# Patient Record
Sex: MALE | Race: WHITE
[De-identification: names, ages, dates, MRNs, and addresses within clinical notes are randomized per-mention and may not be internally consistent; named-entity substitution may affect disease eponyms.]

---

## 2018-08-11 ENCOUNTER — HOSPITAL ENCOUNTER (EMERGENCY)
Dept: HOSPITAL 68 - ERH | Age: 48
End: 2018-08-11
Payer: COMMERCIAL

## 2018-08-11 VITALS — DIASTOLIC BLOOD PRESSURE: 82 MMHG | SYSTOLIC BLOOD PRESSURE: 130 MMHG

## 2018-08-11 VITALS — HEIGHT: 73 IN | WEIGHT: 210 LBS | BODY MASS INDEX: 27.83 KG/M2

## 2018-08-11 DIAGNOSIS — N20.0: Primary | ICD-10-CM

## 2018-08-11 LAB
ABSOLUTE GRANULOCYTE CT: 5.7 /CUMM (ref 1.4–6.5)
BASOPHILS # BLD: 0 /CUMM (ref 0–0.2)
BASOPHILS NFR BLD: 0.4 % (ref 0–2)
EOSINOPHIL # BLD: 0.1 /CUMM (ref 0–0.7)
EOSINOPHIL NFR BLD: 1.4 % (ref 0–5)
ERYTHROCYTE [DISTWIDTH] IN BLOOD BY AUTOMATED COUNT: 13.1 % (ref 11.5–14.5)
GRANULOCYTES NFR BLD: 65.9 % (ref 42.2–75.2)
HCT VFR BLD CALC: 43.5 % (ref 42–52)
LYMPHOCYTES # BLD: 1.9 /CUMM (ref 1.2–3.4)
MCH RBC QN AUTO: 29.8 PG (ref 27–31)
MCHC RBC AUTO-ENTMCNC: 33.9 G/DL (ref 33–37)
MCV RBC AUTO: 87.8 FL (ref 80–94)
MONOCYTES # BLD: 0.9 /CUMM (ref 0.1–0.6)
PLATELET # BLD: 258 /CUMM (ref 130–400)
PMV BLD AUTO: 8 FL (ref 7.4–10.4)
RED BLOOD CELL CT: 4.95 /CUMM (ref 4.7–6.1)
WBC # BLD AUTO: 8.6 /CUMM (ref 4.8–10.8)

## 2018-08-11 NOTE — CT SCAN REPORT
EXAMINATION:
CT ABDOMEN AND PELVIS WITHOUT CONTRAST
 
CLINICAL INFORMATION:
Left lower quadrant pain. Left flank pain.
 
COMPARISON:
None
 
TECHNIQUE:
Multidetector volumetric imaging was performed from the superior aspect of
the liver through the pubic symphysis. Sagittal and coronal reformatted
images were obtained on the technologist's workstation.
 
DLP:
504 mGy-cm
 
FINDINGS:
 
LUNG BASES: The visualized lung bases are unremarkable.
 
LIVER, GALLBLADDER, AND BILIARY TREE: The liver is diffusely low in
attenuation. There are increased areas of density adjacent to the gallbladder
fossa consistent with fatty sparing. No definite focal lesion identified on
this noncontrast study. The gallbladder is unremarkable with no evidence of
radiopaque gallstones, gallbladder wall thickening, or obvious
pericholecystic inflammatory changes.
 
PANCREAS: Unremarkable.
 
SPLEEN: Unremarkable.
 
ADRENAL GLANDS: Unremarkable.
 
KIDNEYS AND URETERS: The right kidney is normal in size, shape, and
attenuation. No hydronephrosis, hydroureter. There is a 1 mm density in the
upper pole. There is a similar finding in the lower pole.. No perinephric
stranding.
 
The left kidney is normal in size. There is mild left renal hydronephrosis.
The proximal ureter is minimally dilated. The distal ureter appears normal in
caliber. No radiodense renal stone identified.
 
BLADDER: Unremarkable.
 
GASTROINTESTINAL TRACT: No dilated loops of small or large bowel. There is a
small hiatal hernia. No intra-abdominal free air or free fluid. Normal
appendix.
 
ABDOMINAL WALL: Bilateral fat-containing inguinal hernias, right greater than
left.
 
LYMPH NODES: Normal.
 
VASCULAR: Unremarkable.
 
PELVIC VISCERA: The prostate appears within normal limits for size. Seminal
vesicles are symmetric and within normal limits.
 
OSSEOUS STRUCTURES: No suspicious lytic or blastic lesion.
 
IMPRESSION:
 
1. Limited noncontrast study.
 
2. Mild left renal hydronephrosis. No definite radiodense obstructing stone.
The majority of the ureter is essentially within normal limits for caliber.
 
3. Severe hepatic steatosis.
 
4. 2 tiny 1 mm nonobstructing stones identified within the collecting system
of the right kidney.

## 2018-08-11 NOTE — ED GI/GU/ABDOMINAL COMPLAINT
History of Present Illness
 
General
Chief Complaint: General Adult
Stated Complaint: PT WAS SENT IN BY URGENT CARE
Source: patient
Exam Limitations: no limitations
 
Vital Signs & Intake/Output
Vital Signs & Intake/Output
 ED Intake and Output
 
 
  0000  1200
 
Intake Total 0 
 
Output Total  
 
Balance 0 
 
   
 
Intake, Oral 0 
 
Patient 210 lb 
 
Weight  
 
Weight Reported by Patient 
 
Measurement  
 
Method  
 
 
 
Allergies
Coded Allergies:
No Known Allergies (18)
 
Reconcile Medications
Ibuprofen 800 MG TABLET   1 TAB PO TID PRN PAIN 
Ondansetron (Zofran Odt) 4 MG TAB.RAPDIS   1 TAB SL TID PRN NAUSEA 
Tamsulosin HCl (Flomax) 0.4 MG CAP.ER.24H   1 CAP PO DAILY KIDNEY STONES 
 
Triage Note:
PT SIB URGENT CARE FOR CT SCAN TO R/O KIDNEY STONE
VS DIVERTICULITIS. PT STATES AROUND 0300 THE LEFT
SIDED FLANK PAIN BEGAN PAIN 2/10. PT DENIES ANY
URINARY S/S. URGENT CARE STATED +2 BLOOD IN URINE.
PT LAST MEDICATED AROUND 0500 WITH 650MG TYLENOL
PO. VSS.
Triage Nurses Notes Reviewed? yes
Onset: Abrupt
Duration: day(s): (1), better, continues in ED
Timing: single episode today
Quality/Severity: cramping
Severity Numbers: 7
Location: left flank
Radiation: back
Activities at Onset: none
Prior Abdominal Problems: none
Past Sexual History: Unobtainable at this time
No Modifying Factors: none
Associated Symptoms: abdominal pain
HPI:
48-year-old male with no medical history presents for evaluation of left flank 
pain.  Patient reports that symptoms started about 24 hours ago and were 
initially very severe.  The pain is located in the left flank and radiates 
slightly in the left back.  Described as sharp and cramping.  Patient states 
when the pain first started was attended 10 currently has improved to a 6 or 7. 
He reports some associated nausea but no vomiting.  No hematuria chest pain 
shortness of breath fever frequency urgency or dysuria.  No numbness or tingling
no trauma or triggering event.  He has not taken any medicine for the pain.
(Clemente Hopper)
 
Past History
 
Travel History
Traveled to [place] past 21 day No
 
Medical History
Any Pertinent Medical History? see below for history
Neurological: NONE
EENT: NONE
Cardiovascular: NONE
Respiratory: NONE
Gastrointestinal: NONE
Hepatic: NONE
Renal: NONE
Musculoskeletal: NONE
Psychiatric: NONE
Endocrine: NONE
 
Surgical History
Surgical History: non-contributory
 
Psychosocial History
What is your primary language English
Tobacco Use: Never used
 
Family History
Hx Contributory? No
(Clemente Hopper)
 
Review of Systems
 
Review of Systems
Constitutional:
Reports: no symptoms. 
EENTM:
Reports: no symptoms. 
Respiratory:
Reports: no symptoms. 
Cardiovascular:
Reports: no symptoms. 
GI:
Reports: see HPI, abdominal pain (FLANK PAIN ). 
Genitourinary:
Reports: no symptoms. 
Musculoskeletal:
Reports: no symptoms. 
Skin:
Reports: no symptoms. 
Neurological/Psychological:
Reports: no symptoms. 
Hematologic/Endocrine:
Reports: no symptoms. 
Immunologic/Allergic:
Reports: no symptoms. 
All Other Systems: Reviewed and Negative
(Clemente Hopper)
 
Physical Exam
 
Physical Exam
General Appearance: well developed/nourished, no apparent distress, alert, awake
Head: atraumatic, normal appearance
Eyes:
Bilateral: normal appearance, PERRL, EOMI. 
Ears, Nose, Throat, Mouth: moist mucous membrane
Neck: normal inspection, supple, full range of motion
Respiratory: normal breath sounds, chest non-tender, no respiratory distress, 
lungs clear
Cardiovascular: regular rate/rhythm, normal peripheral pulses
Peripheral Pulses:
2+ radial (R), 2+ radial (L)
Gastrointestinal: normal bowel sounds, soft, no organomegaly, tenderness (LEFT 
FLANK )
Back: normal inspection, normal range of motion, no vertebral tenderness
Extremities: normal range of motion
Neurologic/Psych: no motor/sensory deficits, awake, alert, oriented x 3, normal 
gait
Skin: intact, normal color, warm/dry
 
Core Measures
ACS in differential dx? No
Sepsis Present: No
Sepsis Focused Exam Completed? No
(Clemente Hopper)
 
Progress
Differential Diagnosis: AAA, appendicitis, biliary colic, bowel obstruction, 
cholecystitis, diverticulitis, gastritis, ischemic bowel, inflamm bowel dis, 
pancreatitis, prostatitis, peptic ulcer, PUD/GERD, perforated viscous, 
pyelonephritis, ureterolithiasis, urinary retention, urethritis, UTI/pyelo
Plan of Care:
 Orders
 
 
Procedure Date/time Status
 
URINALYSIS  1551 Complete
 
TROPONIN LEVEL  1551 Complete
 
LIPASE  1551 Complete
 
COMPREHENSIVE METABOLIC PANEL  1551 Complete
 
CBC WITHOUT DIFFERENTIAL  1551 Complete
 
 
 Laboratory Tests
 
 
18 1634:
Urine Color YEL, Urine Clarity CLEAR, Urine pH 6.0, Ur Specific Gravity <= 1.005
, Urine Protein NEG, Urine Ketones NEG, Urine Nitrite NEG, Urine Bilirubin NEG, 
Urine Urobilinogen 0.2, Ur Leukocyte Esterase NEG, Ur Microscopic SEDIMENT 
EXAMINED, Urine RBC RARE, Ur Epithelial Cells RARE, Urine Hemoglobin SMALL  H, 
Urine Glucose NEG
 
18 1626:
Anion Gap 8, Estimated GFR > 60, BUN/Creatinine Ratio 13.0, Glucose 84, Calcium 
9.5, Total Bilirubin 0.7, AST 24, ALT 46, Alkaline Phosphatase 63, Troponin I < 
0.01, Total Protein 7.5, Albumin 4.5, Globulin 3.0, Albumin/Globulin Ratio 1.5, 
Lipase 50, CBC w Diff NO MAN DIFF REQ, RBC 4.95, MCV 87.8, MCH 29.8, MCHC 33.9, 
RDW 13.1, MPV 8.0, Gran % 65.9, Lymphocytes % 22.4, Monocytes % 9.9  H, 
Eosinophils % 1.4, Basophils % 0.4, Absolute Granulocytes 5.7, Absolute 
Lymphocytes 1.9, Absolute Monocytes 0.9  H, Absolute Eosinophils 0.1, Absolute 
Basophils 0
 
 
Patient is here for evaluation of left flank pain.  Symptoms started abruptly 
and have improved since first starting.  On exam vital signs are stable he does 
have left flank tenderness.  Lab CT scan ordered.
 
Blood work is unremarkable no signs of infection the area but there is a small 
amount of hemoglobin.  The CT scan showed some mild left-sided hydronephrosis 
without obstructing stone.  This examination the patient's rapidly improving 
symptoms may suggest a previously passed kidney stone.  He does have some stones
in the right kidney.  Patient also has severe hepatic steatosis.  Reviewed 
results patient.  He is feeling better he's tolerating fluids.  He'll be 
discharged home with a prescription for Flomax ibuprofen and Zofran.  He was 
given strainers for his urine.  Follow-up with urology.  Discussed return 
precautions patient agrees
Diagnostic Imaging:
Viewed by Me: CT Scan.  Discussed w/RAD: CT Scan. 
Radiology Impression: PATIENT: LULA ARAGON  MEDICAL RECORD NO: [id number] PRESENT 
AGE: 48  PATIENT ACCOUNT NO: [id number] : 70  LOCATION: [facility] ORDERING 
PHYSICIAN: Clemente DILL     SERVICE DATE: 18- EXAM TYPE: CAT - CT ABD
& PELVIS W/O IV CONTRAS EXAMINATION: CT ABDOMEN AND PELVIS WITHOUT CONTRAST 
CLINICAL INFORMATION: Left lower quadrant pain. Left flank pain. COMPARISON: 
None TECHNIQUE: Multidetector volumetric imaging was performed from the superior
aspect of the liver through the pubic symphysis. Sagittal and coronal 
reformatted images were obtained on the technologist's workstation. DLP: 504 mGy
-cm FINDINGS: LUNG BASES: The visualized lung bases are unremarkable. LIVER, 
GALLBLADDER, AND BILIARY TREE: The liver is diffusely low in attenuation. There 
are increased areas of density adjacent to the gallbladder fossa consistent with
fatty sparing. No definite focal lesion identified on this noncontrast study. 
The gallbladder is unremarkable with no evidence of radiopaque gallstones, 
gallbladder wall thickening, or obvious pericholecystic inflammatory changes. 
PANCREAS: Unremarkable. SPLEEN: Unremarkable. ADRENAL GLANDS: Unremarkable. 
KIDNEYS AND URETERS: The right kidney is normal in size, shape, and attenuation.
No hydronephrosis, hydroureter. There is a 1 mm density in the upper pole. There
is a similar finding in the lower pole.. No perinephric stranding. The left 
kidney is normal in size. There is mild left renal hydronephrosis. The proximal 
ureter is minimally dilated. The distal ureter appears normal in caliber. No 
radiodense renal stone identified. BLADDER: Unremarkable. GASTROINTESTINAL TRACT
: No dilated loops of small or large bowel. There is a small hiatal hernia. No 
intra-abdominal free air or free fluid. Normal appendix. ABDOMINAL WALL: 
Bilateral fat-containing inguinal hernias, right greater than left. LYMPH NODES:
Normal. VASCULAR: Unremarkable. PELVIC VISCERA: The prostate appears within 
normal limits for size. Seminal vesicles are symmetric and within normal limits.
OSSEOUS STRUCTURES: No suspicious lytic or blastic lesion. IMPRESSION: 1. 
Limited noncontrast study. 2. Mild left renal hydronephrosis. No definite 
radiodense obstructing stone. The majority of the ureter is essentially within 
normal limits for caliber. 3. Severe hepatic steatosis. 4. 2 tiny 1 mm 
nonobstructing stones identified within the collecting system of the right 
kidney. DICTATED BY: Pat Gomez MD  DATE/TIME DICTATED:18 
:RAD.JADE  DATE/TIME TRANSCRIBED:18 CONFIDENTIAL, 
DO NOT COPY WITHOUT APPROPRIATE AUTHORIZATION.  <Electronically signed in Other 
Vendor System>                                                                  
                     SIGNED BY: Pat Gomez MD 18 [id number]
Initial ED EKG: none
(Clemente Hopper)
 
Departure
 
Departure
Disposition: HOME OR SELF CARE
Condition: Stable
Clinical Impression
Primary Impression: Nephrolithiasis
Referrals:
Aicha IVY,Arturo ZAMBRANO (PCP/Family)
 
Danie Ahmadi MD
 
Additional Instructions:
Rest and drink plenty of fluids.  Ibuprofen 800 mg every 8 hours as needed for 
pain.  Zofran for nausea.  Flomax as directed.  Urinate THROUGH a strainer.  
Make a follow-up with YOUr primary care doctor to review all results of today's 
visit.  YOU should also see Dr. Ahmadi urologist as soon as possible.  Monitor 
symptoms closely if you have fever or worsening pain unable tolerate fluids 
unable to urinate or any other concerns return immediately.
 
Please go over all results of today's visit with your primary care doctor.  
Contact your primary care doctor to let them know you were here in the emergency
room.  There may be nonspecific findings which may not be related to your visit 
today here in the emergency room but may require further evaluation and chronic 
monitoring by your primary care doctor.  If you had a laceration today the 
chance of foreign body always remains. You should follow-up with your primary 
care doctor for recheck in 3-5 days for a wound check.  If you had an x-ray done
there is a chance that a fracture could have been missed on initial read and you
should follow-up with your primary care doctor for repeat x-rays if symptoms 
persist.  If your blood pressure was elevated here in the emergency room please 
have rechecked by [facility] primary care doctor within the next 48.  If you were 
prescribed a narcotic here in the emergency room or any type of controlled 
substances you're not allowed to drive while taking this medication or operate 
any type of heavy machinery.  Narcotics can make you feel lightheaded dizziness 
nausea and can cause constipation.  You may need to  a stool softener.  
Thank you for choosing [facility] emergency room.  Please return to the 
emergency room immediately if you have any other concerns worsening of symptoms.
 
Departure Forms:
Customer Survey
General Discharge Information
Prescriptions:
Current Visit Scripts
Tamsulosin HCl (Flomax) 1 CAP PO DAILY  
     #15 CAP 
 
Ibuprofen 1 TAB PO TID PRN PAIN  
     #30 TAB 
 
Ondansetron (Zofran Odt) 1 TAB SL TID PRN NAUSEA  
     #15 TAB 
 
 
(Clemente Hopper)
 
PA/NP Co-Sign Statement
Statement:
ED Attending supervision documentation-
 
[] I saw and evaluated the patient. I have also reviewed all the pertinent lab 
results and diagnostic results. I agree with the findings and the plan of care 
as documented in the PA's/NP's documentation. 
 
[X] I have reviewed the ED Record and agree with the PA's/NP's documentation.
 
[] Additions or exceptions (if any) to the PAs/NP's note and plan are 
summarized below:
[]
 
(Willy Kurtz DO